# Patient Record
(demographics unavailable — no encounter records)

---

## 2025-05-15 NOTE — REASON FOR VISIT
[Sleep Evaluation] : sleep evaluation [Asthma] : asthma [Follow-Up] : a follow-up visit [Sleep Apnea] : sleep apnea

## 2025-05-15 NOTE — HISTORY OF PRESENT ILLNESS
[TextBox_4] : Patient found to have abnormal function She has a new endocrinologist and her medications were adjusted  she is feeling much better now. Allergies have been acting up predominantly nasal We discussed new routines including saline washes.

## 2025-05-15 NOTE — REVIEW OF SYSTEMS
Sorry things do not go well. We can restart Concerta and Ritalin if it was better and discontinue Quillivant. As we discussed Lamictal takes time to start working because we go very slow to avoid rashes.  I would not expect to see it's working fast. As we discussed other options of mood stabilizers are available as Abilify and Risperidone that work fast but have more side effects. Side effects were discussed at the time of the appointment.    We keep weekly communications and updates to make sure you can express all your concerns. Usually doctors talk to patients at the time of the appointments and again we keep communications going weekly.   Please let the clinic know if you want medication to be changed. Please let us know if you want to proceed with [facility] testing.  Thank you.    [Negative] : Endocrine [TextBox_30] : see [facility]

## 2025-05-15 NOTE — ASSESSMENT
[FreeTextEntry1] : Patient feeling much better after her thyroid was readjusted We will continue to monitor the situation for another few months until she is fully stabilized We will have a televisit at that point to reevaluate her progress. 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 Rx to use saline nasal washes BID. add azelastine at HS f/u 3m